# Patient Record
Sex: MALE | Race: OTHER | HISPANIC OR LATINO | Employment: FULL TIME | URBAN - METROPOLITAN AREA
[De-identification: names, ages, dates, MRNs, and addresses within clinical notes are randomized per-mention and may not be internally consistent; named-entity substitution may affect disease eponyms.]

---

## 2018-01-03 ENCOUNTER — HOSPITAL ENCOUNTER (OUTPATIENT)
Facility: MEDICAL CENTER | Age: 48
End: 2018-01-03
Attending: NURSE PRACTITIONER
Payer: COMMERCIAL

## 2018-01-03 PROCEDURE — 82306 VITAMIN D 25 HYDROXY: CPT

## 2018-01-03 PROCEDURE — 84443 ASSAY THYROID STIM HORMONE: CPT

## 2018-01-03 PROCEDURE — 82607 VITAMIN B-12: CPT

## 2018-01-04 LAB
25(OH)D3 SERPL-MCNC: 12 NG/ML (ref 30–100)
TSH SERPL DL<=0.005 MIU/L-ACNC: 1.38 UIU/ML (ref 0.38–5.33)
VIT B12 SERPL-MCNC: 262 PG/ML (ref 211–911)

## 2024-01-04 ENCOUNTER — NON-PROVIDER VISIT (OUTPATIENT)
Dept: URGENT CARE | Facility: CLINIC | Age: 54
End: 2024-01-04
Payer: COMMERCIAL

## 2024-01-04 ENCOUNTER — OCCUPATIONAL MEDICINE (OUTPATIENT)
Dept: URGENT CARE | Facility: CLINIC | Age: 54
End: 2024-01-04
Payer: COMMERCIAL

## 2024-01-04 VITALS
TEMPERATURE: 97.4 F | HEART RATE: 62 BPM | DIASTOLIC BLOOD PRESSURE: 80 MMHG | HEIGHT: 66 IN | RESPIRATION RATE: 12 BRPM | BODY MASS INDEX: 24.73 KG/M2 | SYSTOLIC BLOOD PRESSURE: 132 MMHG | WEIGHT: 153.9 LBS | OXYGEN SATURATION: 100 %

## 2024-01-04 DIAGNOSIS — Z02.6 ENCOUNTER RELATED TO WORKER'S COMPENSATION CLAIM: ICD-10-CM

## 2024-01-04 DIAGNOSIS — R22.0 LIP SWELLING: ICD-10-CM

## 2024-01-04 DIAGNOSIS — S00.531A CONTUSION OF LIP, INITIAL ENCOUNTER: ICD-10-CM
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
## 2024-01-04 LAB
AMP AMPHETAMINE: NORMAL
BREATH ALCOHOL COMMENT: NORMAL
COC COCAINE: NORMAL
INT CON NEG: NORMAL
INT CON POS: NORMAL
MET METHAMPHETAMINES: NORMAL
OPI OPIATES: NORMAL
PCP PHENCYCLIDINE: NORMAL
POC BREATHALIZER: 0 PERCENT (ref 0–0.01)
POC DRUG COMMENT 753798-OCCUPATIONAL HEALTH: NEGATIVE
THC: NORMAL

## 2024-01-04 PROCEDURE — 80305 DRUG TEST PRSMV DIR OPT OBS: CPT | Performed by: PHYSICIAN ASSISTANT

## 2024-01-04 PROCEDURE — 82075 ASSAY OF BREATH ETHANOL: CPT | Performed by: PHYSICIAN ASSISTANT

## 2024-01-04 PROCEDURE — 3075F SYST BP GE 130 - 139MM HG: CPT | Performed by: PHYSICIAN ASSISTANT

## 2024-01-04 PROCEDURE — 3079F DIAST BP 80-89 MM HG: CPT | Performed by: PHYSICIAN ASSISTANT

## 2024-01-04 PROCEDURE — 99203 OFFICE O/P NEW LOW 30 MIN: CPT | Performed by: PHYSICIAN ASSISTANT

## 2024-01-04 RX ORDER — AMOXICILLIN AND CLAVULANATE POTASSIUM 875; 125 MG/1; MG/1
1 TABLET, FILM COATED ORAL 2 TIMES DAILY
Qty: 10 TABLET | Refills: 0 | Status: SHIPPED | OUTPATIENT
Start: 2024-01-04 | End: 2024-01-09

## 2024-01-04 NOTE — LETTER
[facility] [address] [place] [address]  Phone:  [phone] - Fax:  [phone]   [facility] Progress Report and Disability Certification  Date of Service: 1/4/2024   No Show:  No  Date / Time of Next Visit:     Claim Information   Patient Name: Otf Pabon  Claim Number:     Employer: OTHER  Date of Injury: 1/4/2024     Insurer / TPA: [employer]  ID / SSN:     Occupation: Fence  Diagnosis: Diagnoses of Contusion of lip, initial encounter, Lip swelling, and Work related injury were pertinent to this visit.    Medical Information   Related to Industrial Injury? Yes    Subjective Complaints:  DOI: 1/4/2024    ADEOLA: Was working on installing a fence when he was struck in the mouth with a piece of wood    Initial visit:  Presents today for the above-stated injury on the above-stated date.  States that following the injury has been experiencing localized swelling present over the left lower lip.  He denies any loose teeth at this time.  No headache, no vision changes.  No neck pain or stiffness.  He does have past injury to the mouth that did result in knocking out several teeth.  No secondary occupation.   Objective Findings: Examination of the external lip does reveal localized swelling with skin abrasions present on the external surface of the lip.  Internal examination of the lip does reveal a small bite wound from the lower teeth without excessive wound gaping.  There is missing teeth on the left lower jaw, patient states this is not a result from his injury today.   Pre-Existing Condition(s):     Assessment:   Initial Visit    Status: Additional Care Required  Comments:Return to the urgent care in 5 days  Permanent Disability:No    Plan:   Comments:Start the patient on Augmentin due to the bite wound present on the internal surface of the left lower lip.  Use over-the-counter medications for pain and swelling.   Return to urgent care in 5 days for reevaluation.  Able to perform full work duty    Diagnostics:      Comments:       Disability Information   Status: Released to Full Duty    From:     Through:   Restrictions are: Temporary   Physical Restrictions   Sitting:    Standing:    Stooping:    Bending:      Squatting:    Walking:    Climbing:    Pushing:      Pulling:    Other:    Reaching Above Shoulder (L):   Reaching Above Shoulder (R):       Reaching Below Shoulder (L):    Reaching Below Shoulder (R):      Not to exceed Weight Limits   Carrying(hrs):   Weight Limit(lb):   Lifting(hrs):   Weight  Limit(lb):     Comments: Start the patient on Augmentin due to the bite wound present on the internal surface of the left lower lip. Use over-the-counter medications for pain and swelling. Return to urgent care in 5 days for reevaluation. Able to perform full work duty    Repetitive Actions   Hands: i.e. Fine Manipulations from Grasping:     Feet: i.e. Operating Foot Controls:     Driving / Operate Machinery:     Health Care Provider’s Original or Electronic Signature  Romain Winston P.A.-C. Health Care Provider’s Original or Electronic Signature    Fernando Walls DO MPH     Clinic Name / Location: [place] [address]  [place] [address] Clinic Phone Number: Dept: [phone]   Appointment Time: 10:45 Am Visit Start Time: 1:54 PM   Check-In Time:  11:48 Am Visit Discharge Time:     Original-Treating Physician or Chiropractor    Page 2-Insurer/TPA    Page 3-Employer    Page 4-Employee

## 2024-01-04 NOTE — PROGRESS NOTES
"Subjective:     Otf Pabon is a 53 y.o. male who presents for Other (NEW WC DOI: 1/4/24 (L) side of lower lip )      DOI: 1/4/2024    ADEOLA: Was working on installing a fence when he was struck in the mouth with a piece of wood    Initial visit:  Presents today for the above-stated injury on the above-stated date.  States that following the injury has been experiencing localized swelling present over the left lower lip.  He denies any loose teeth at this time.  No headache, no vision changes.  No neck pain or stiffness.  He does have past injury to the mouth that did result in knocking out several teeth.  No secondary occupation.    PMH:   No pertinent past medical history to this problem  MEDS:  Medications were reviewed in EMR  ALLERGIES:  Allergies were reviewed in EMR  SOCHX:  Works as a labor  FH:   No pertinent family history to this problem       Objective:     /80 (BP Location: Left arm, Patient Position: Sitting, BP Cuff Size: Adult)   Pulse 62   Temp 36.3 °C (97.4 °F) (Temporal)   Resp 12   Ht 1.676 m (5' 6\")   Wt 69.8 kg (153 lb 14.4 oz)   SpO2 100%   BMI 24.84 kg/m²     Examination of the external lip does reveal localized swelling with skin abrasions present on the external surface of the lip.  Internal examination of the lip does reveal a small bite wound from the lower teeth without excessive wound gaping.  There is missing teeth on the left lower jaw, patient states this is not a result from his injury today.    Assessment/Plan:     Encounter Diagnoses   Name Primary?    Contusion of lip, initial encounter     Lip swelling     Work related injury          Plan:  Start the patient on Augmentin due to the bite wound present on the internal surface of the left lower lip. Use over-the-counter medications for pain and swelling. Return to urgent care in 5 days for reevaluation. Able to perform full work duty     Differential diagnosis, natural history, supportive care, and " indications for immediate follow-up discussed.    Romain Winston PA-C

## 2024-01-04 NOTE — LETTER
"    EMPLOYEE’S CLAIM FOR COMPENSATION/ REPORT OF INITIAL TREATMENT  FORM C-4  PLEASE TYPE OR PRINT    EMPLOYEE’S CLAIM - PROVIDE ALL INFORMATION REQUESTED   First Name                    CELSA Vanessa Last Name  Pepper Birthdate                    1970                Sex  []M  []F Claim Number (Insurer’s Use Only)     Home Address  [address] Age  53 y.o. Height  1.676 m (5' 6\") Weight  69.8 kg (153 lb 14.4 oz) Social Security Number     [facility] [place]  [address] Telephone  There are no phone numbers on file.   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 (if applicable)  Trabajando    How did this injury or occupational disease occur? (Be specific and answer in detail. Use additional sheet if necessary)  estave cortando postes [id number] pt. para empesar a aser e serco de madesa y con la corTadora electrica y se atoro y Revoto a Mi boca y paso   If you believe that you have an occupational disease, when did you first have knowledge of the disability and its relationship to your employment?  N/A Witnesses to the Accident (if applicable)  Si mi ayudante      Nature of Injury or Occupational Disease  Amputation  Part(s) of Body Injured or Affected  Mouth N/A " N/A    I CERTIFY THAT THE ABOVE IS TRUE AND CORRECT TO T HE BEST OF MY KNOWLEDGE AND THAT I HAVE PROVIDED THIS INFORMATION IN ORDER TO OBTAIN THE BENEFITS OF [place], OR [place]).  I HEREBY AUTHORIZE ANY PHYSICIAN, CHIROPRACTOR, SURGEON, PRACTITIONER OR ANY OTHER PERSON, ANY HOSPITAL, INCLUDING [facility] OR [facility], ANY  MEDICAL SERVICE ORGANIZATION, ANY INSURANCE COMPANY, OR OTHER INSTITUTION OR ORGANIZATION TO RELEASE TO EACH OTHER, ANY MEDICAL OR OTHER INFORMATION, INCLUDING BENEFITS PAID OR PAYABLE, PERTINENT TO THIS INJURY OR DISEASE, EXCEPT INFORMATION RELATIVE TO DIAGNOSIS, TREATMENT AND/OR COUNSELING FOR AIDS, PSYCHOLOGICAL CONDITIONS, ALCOHOL OR CONTROLLED SUBSTANCES, FOR WHICH I MUST GIVE SPECIFIC AUTHORIZATION.  A PHOTOSTAT OF THIS AUTHORIZATION SHALL BE VALID AS THE ORIGINAL.     Date   Place Employee’s Original or  *Electronic Signature   THIS REPORT MUST BE COMPLETED AND MAILED WITHIN 3 WORKING DAYS OF TREATMENT   Place  [facility]    Name of Facility  [facility]   Date 1/4/2024 Diagnosis and Description of Injury or Occupational Disease  (S00.531A) Contusion of lip, initial encounter  (R22.0) Lip swelling  (Y99.0) Work related injury  Diagnoses of Contusion of lip, initial encounter, Lip swelling, and Work related injury were pertinent to this visit. Is there evidence that the injured employee was under the influence of alcohol and/or another controlled substance at the time of accident?  []No  [] Yes (if yes, please explain)   Hour 1:54 PM  No   Treatment: Start the patient on Augmentin due to the bite wound present on the internal surface of the left lower lip. Use over-the-counter medications for pain and swelling. Return to urgent care in 5 days for reevaluation. Able to perform full work duty    Have you advised the patient to remain off work five days or more?   []  Yes Indicate dates: From   To    []No If no, is the injured employee capable of: [] full duty [] modified duty                                                             Yes     If modified duty, specify any limitations / restrictions:                                                                                                                                                                                                                                                                                                                                                                                                                  X-Ray Findings:      From information given by the employee, together with medical evidence, can you directly connect this injury or occupational disease as job incurred?  []Yes   [] No Yes    Is additional medical care by a physician indicated? []Yes [] No  Yes    Do you know of any previous injury or disease contributing to this condition or occupational disease? []Yes [] No (Explain if yes)                          No   Date  1/4/2024 Print Health Care Provider’s Name  Kassie Winston P.A.-C. I certify that the employer’s copy of  this form was delivered to the employer on:   Address  [address] [place] [address] INSURER'S USE ONLY                       [facility]  [address] Provider’s Tax ID Number  [id number]   Telephone  Dept: [phone]    Health Care Provider’s Original or Electronic Signature  e-KASSIE Sahu P.A.-C. Degree (MD,DO, DC,PA-C,APRN)  [employer]  Choose (if applicable)      ORIGINAL - TREATING HEALTHCARE PROVIDER PAGE 2 - INSURER/TPA PAGE 3 - EMPLOYER PAGE 4 - EMPLOYEE             Form C-4 (rev.08/23)        BRIEF DESCRIPTION OF RIGHTS AND BENEFITS  (Pursuant to [place] 616C.050)    Notice of Injury or Occupational Disease (Incident Report Form C-1): If an injury or occupational disease (OD) arises out of and in the course of employment, you must provide written notice to  "your employer as soon as practicable, but no later than 7 days after the accident or OD. Your employer shall maintain a sufficient supply of the required forms.    Claim for Compensation (Form C-4): If medical treatment is sought, the form C-4 is available at the place of initial treatment. A completed \"Claim for Compensation\" (Form C-4) must be filed within 90 days after an accident or OD. The treating physician or chiropractor must, within 3 working days after treatment, complete and mail to the employer, the employer's insurer and third-party , the Claim for Compensation.    Medical Treatment: If you require medical treatment for your on-the-job injury or OD, you may be required to select a physician or chiropractor from a list provided by your workers’ compensation insurer, if it has contracted with an Organization for Managed Care (MCO) or Preferred Provider Organization (PPO) or providers of health care. If your employer has not entered into a contract with an MCO or PPO, you may select a physician or chiropractor from the Panel of Physicians and Chiropractors. Any medical costs related to your industrial injury or OD will be paid by your insurer.    Temporary Total Disability (TTD): If your doctor has certified that you are unable to work for a period of at least 5 consecutive days, or 5 cumulative days in a 20-day period, or places restrictions on you that your employer does not accommodate, you may be entitled to TTD compensation.    Temporary Partial Disability (TPD): If the wage you receive upon reemployment is less than the compensation for TTD to which you are entitled, the insurer may be required to pay you TPD compensation to make up the difference. TPD can only be paid for a maximum of 24 months.    Permanent Partial Disability (PPD): When your medical condition is stable and there is an indication of a PPD as a result of your injury or OD, within 30 days, your insurer must arrange for " an evaluation by a rating physician or chiropractor to determine the degree of your PPD. The amount of your PPD award depends on the date of injury, the results of the PPD evaluation, your age and wage.    Permanent Total Disability (PTD): If you are medically certified by a treating physician or chiropractor as permanently and totally disabled and have been granted a PTD status by your insurer, you are entitled to receive monthly benefits not to exceed 66 2/3% of your average monthly wage. The amount of your PTD payments is subject to reduction if you previously received a lump-sum PPD award.    Vocational Rehabilitation Services: You may be eligible for vocational rehabilitation services if you are unable to return to the job due to a permanent physical impairment or permanent restrictions as a result of your injury or occupational disease.    Transportation and Per Priscilla Reimbursement: You may be eligible for travel expenses and per priscilla associated with medical treatment.    Reopening: You may be able to reopen your claim if your condition worsens after claim closure.     Appeal Process: If you disagree with a written determination issued by the insurer or the insurer does not respond to your request, you may appeal to the Department of Administration, , by following the instructions contained in your determination letter. You must appeal the determination within 70 days from the date of the determination letter at [address], [address], [place], [place] [address], or [address] [facility] [address], [place] [address]. If you disagree with the  decision, you may appeal to the Department of Administration, . You must file your appeal within 30 days from the date of the  decision letter at [address], [address], [place], [place] [address], or [address] [facility] [address], [place] [address]. If you disagree with a  decision of an , you may file a petition for judicial review with the District Court. You must do so within 30 days of the Appeal Officer’s decision. You may be represented by an  at your own expense or you may contact the [facility] for possible representation.    [place]  for Injured Workers (NAIW): If you disagree with a  decision, you may request that NAIW represent you without charge at an  Hearing. For information regarding denial of benefits, you may contact the [facility] at: [address], [address], [place] [address], [phone], or [address], [place] [address], [phone]    To File a Complaint with the Division: If you wish to file a complaint with the  of the Division of Industrial Relations (DIR),  please contact the Workers’ Compensation Section, [address], [address], [place], [place] [address], telephone [phone]) [phone], or [address], [address], [place], [place] [address], telephone [phone].    For assistance with Workers’ Compensation Issues: You may contact the [facility] Office for Consumer Health Assistance, [address], [address], [place], [place] [address], Toll Free 1-846.707.6895, Web site: [email or web address] E-mail: [email or web address]              __________________________________________________________________                                    _________________            Employee Name / Signature                                                                                                                            Date                                                                                                                                                                                                                              D-2 (rev. 10/20)

## 2024-01-09 ENCOUNTER — OCCUPATIONAL MEDICINE (OUTPATIENT)
Dept: URGENT CARE | Facility: CLINIC | Age: 54
End: 2024-01-09
Payer: COMMERCIAL

## 2024-01-09 VITALS
BODY MASS INDEX: 24.94 KG/M2 | HEART RATE: 72 BPM | HEIGHT: 66 IN | WEIGHT: 155.2 LBS | TEMPERATURE: 99.2 F | DIASTOLIC BLOOD PRESSURE: 76 MMHG | SYSTOLIC BLOOD PRESSURE: 110 MMHG | OXYGEN SATURATION: 97 % | RESPIRATION RATE: 14 BRPM

## 2024-01-09 DIAGNOSIS — S00.501A SUPERFICIAL INJURY OF LIP WITH INFECTION, INITIAL ENCOUNTER: ICD-10-CM

## 2024-01-09 DIAGNOSIS — L08.9 SUPERFICIAL INJURY OF LIP WITH INFECTION, INITIAL ENCOUNTER: ICD-10-CM

## 2024-01-09 DIAGNOSIS — S00.531D CONTUSION OF LIP, SUBSEQUENT ENCOUNTER: ICD-10-CM

## 2024-01-09 PROCEDURE — 99214 OFFICE O/P EST MOD 30 MIN: CPT | Performed by: NURSE PRACTITIONER

## 2024-01-09 PROCEDURE — 3074F SYST BP LT 130 MM HG: CPT | Performed by: NURSE PRACTITIONER

## 2024-01-09 PROCEDURE — 3078F DIAST BP <80 MM HG: CPT | Performed by: NURSE PRACTITIONER

## 2024-01-09 RX ORDER — CLINDAMYCIN HYDROCHLORIDE 300 MG/1
300 CAPSULE ORAL 3 TIMES DAILY
Qty: 15 CAPSULE | Refills: 0 | Status: SHIPPED | OUTPATIENT
Start: 2024-01-09 | End: 2024-01-14

## 2024-01-09 RX ORDER — LIDOCAINE HYDROCHLORIDE 20 MG/ML
15 SOLUTION OROPHARYNGEAL PRN
Qty: 100 ML | Refills: 0 | Status: SHIPPED | OUTPATIENT
Start: 2024-01-09

## 2024-01-09 NOTE — LETTER
[facility] [address] [place] [address]  Phone:  [phone] - Fax:  [phone]   [facility] Progress Report and Disability Certification  Date of Service: 1/9/2024   No Show:  No  Date / Time of Next Visit: 1/13/2024 @ 11:00 AM    Claim Information   Patient Name: Otf Pabon  Claim Number:     Employer: [employer]  Date of Injury: 1/4/2024     Insurer / TPA: [employer]  ID / SSN:     Occupation: Fence  Diagnosis: Diagnoses of Contusion of lip, subsequent encounter and Superficial injury of lip with infection, initial encounter were pertinent to this visit.    Medical Information   Related to Industrial Injury? Yes    Subjective Complaints:    DOI: 1/4/2024    ADEOLA: Was working on installing a fence when he was struck in the mouth with a piece of wood  Patient returns urgent care for first follow-up visit after initial injury continues to have notable amount of pain and swelling of the wound of the left lip with swelling.  Has pain.  Is concerned of worsening infection.  Currently taking Augmentin and has 1 day left of prescription.  Denies any fevers.  Has been back to work tolerating.     Objective Findings: Lip: Left lower lip edematous, inner mucosa superficial wound present with purulent discharge.  No erythema no dental pain afebrile   Pre-Existing Condition(s):     Assessment:   Condition Worsened    Status: Additional Care Required  Permanent Disability:No    Plan: Medication    Diagnostics:      Comments:       Disability Information   Status: Released to Full Duty    From:  1/9/2024  Through: 1/13/2024 Restrictions are:     Physical Restrictions   Sitting:    Standing:    Stooping:    Bending:      Squatting:    Walking:    Climbing:    Pushing:      Pulling:    Other:    Reaching Above Shoulder (L):   Reaching Above Shoulder (R):       Reaching Below Shoulder (L):    Reaching Below Shoulder (R):      Not  to exceed Weight Limits   Carrying(hrs):   Weight Limit(lb):   Lifting(hrs):   Weight  Limit(lb):     Comments: Encourage patient to finish last day of Augmentin he will be started on clindamycin x 5 days as I am concerned of present infection lidocaine provided for pain relief.  Patient will continue with full duty without restrictions.  Follow-up in 4 days for recheck.    Repetitive Actions   Hands: i.e. Fine Manipulations from Grasping:     Feet: i.e. Operating Foot Controls:     Driving / Operate Machinery:     Health Care Provider’s Original or Electronic Signature  NIGEL Patterson. Health Care Provider’s Original or Electronic Signature    Fernando Walls DO MPH     Clinic Name / Location: [address] [facility]  [place] [address] Clinic Phone Number: Dept: [phone]   Appointment Time: 4:00 Pm Visit Start Time: 4:13 PM   Check-In Time:  3:42 Pm Visit Discharge Time:  4:42 PM    Original-Treating Physician or Chiropractor    Page 2-Insurer/TPA    Page 3-Employer    Page 4-Employee

## 2024-01-10 NOTE — PROGRESS NOTES
"Subjective:   Otf Pabon  is a 53 y.o. male who presents for Follow-Up (Patient states they still have px from last visit. Painful to eat x 2 days.)      DOI: 1/4/2024    ADEOLA: Was working on installing a fence when he was struck in the mouth with a piece of wood  Patient returns urgent care for first follow-up visit after initial injury continues to have notable amount of pain and swelling of the wound of the left lip with swelling.  Has pain.  Is concerned of worsening infection.  Currently taking Augmentin and has 1 day left of prescription.  Denies any fevers.  Has been back to work tolerating.     HPI  Review of Systems   HENT:          Lip injury        No Known Allergies   Objective:   /76   Pulse 72   Temp 37.3 °C (99.2 °F) (Temporal)   Resp 14   Ht 1.676 m (5' 6\")   Wt 70.4 kg (155 lb 3.2 oz)   SpO2 97%   BMI 25.05 kg/m²   Physical Exam  Constitutional:       Appearance: Normal appearance. He is not ill-appearing or toxic-appearing.   HENT:      Head: Normocephalic.      Right Ear: External ear normal.      Left Ear: External ear normal.      Nose: Nose normal.      Mouth/Throat:      Lips: Pink.     Eyes:      General: Lids are normal.   Pulmonary:      Effort: Pulmonary effort is normal. No accessory muscle usage.   Musculoskeletal:      Cervical back: Full passive range of motion without pain.   Neurological:      Mental Status: He is alert and oriented to person, place, and time.   Psychiatric:         Mood and Affect: Mood normal.         Thought Content: Thought content normal.       Lip: Left lower lip edematous, inner mucosa superficial wound present with purulent discharge.  No erythema no dental pain afebrile   Assessment/Plan:   1. Contusion of lip, subsequent encounter  - lidocaine (XYLOCAINE) 2 % Solution; Take 15 mL by mouth as needed for Throat/Mouth Pain.  Dispense: 100 mL; Refill: 0  - clindamycin (CLEOCIN) 300 MG Cap; Take 1 Capsule by mouth 3 times a day for " 5 days.  Dispense: 15 Capsule; Refill: 0    2. Superficial injury of lip with infection, initial encounter  - lidocaine (XYLOCAINE) 2 % Solution; Take 15 mL by mouth as needed for Throat/Mouth Pain.  Dispense: 100 mL; Refill: 0  - clindamycin (CLEOCIN) 300 MG Cap; Take 1 Capsule by mouth 3 times a day for 5 days.  Dispense: 15 Capsule; Refill: 0  Encourage patient to finish last day of Augmentin he will be started on clindamycin x 5 days as I am concerned of present infection lidocaine provided for pain relief.  Patient will continue with full duty without restrictions.  Follow-up in 4 days for recheck.  Differential diagnosis, natural history, supportive care, and indications for immediate follow-up discussed.

## 2024-01-13 ENCOUNTER — OCCUPATIONAL MEDICINE (OUTPATIENT)
Dept: URGENT CARE | Facility: CLINIC | Age: 54
End: 2024-01-13
Payer: COMMERCIAL

## 2024-01-13 VITALS
HEIGHT: 66 IN | HEART RATE: 67 BPM | BODY MASS INDEX: 24.91 KG/M2 | OXYGEN SATURATION: 99 % | SYSTOLIC BLOOD PRESSURE: 102 MMHG | WEIGHT: 155 LBS | TEMPERATURE: 97.9 F | DIASTOLIC BLOOD PRESSURE: 60 MMHG | RESPIRATION RATE: 16 BRPM

## 2024-01-13 DIAGNOSIS — S00.531D CONTUSION OF LIP, SUBSEQUENT ENCOUNTER: ICD-10-CM

## 2024-01-13 PROCEDURE — 99213 OFFICE O/P EST LOW 20 MIN: CPT | Performed by: NURSE PRACTITIONER

## 2024-01-13 PROCEDURE — 3078F DIAST BP <80 MM HG: CPT | Performed by: NURSE PRACTITIONER

## 2024-01-13 PROCEDURE — 3074F SYST BP LT 130 MM HG: CPT | Performed by: NURSE PRACTITIONER

## 2024-01-13 RX ORDER — CHLORHEXIDINE GLUCONATE ORAL RINSE 1.2 MG/ML
15 SOLUTION DENTAL 2 TIMES DAILY
Qty: 120 ML | Refills: 0 | Status: SHIPPED | OUTPATIENT
Start: 2024-01-13 | End: 2024-01-17

## 2024-01-13 ASSESSMENT — ENCOUNTER SYMPTOMS
BLURRED VISION: 0
FEVER: 0
DIZZINESS: 0
HEADACHES: 0

## 2024-01-13 NOTE — LETTER
[facility] [address] [place] [address]  Phone:  [phone] - Fax:  [phone]   [facility] Progress Report and Disability Certification  Date of Service: 1/13/2024   No Show:  No  Date / Time of Next Visit: 1/17/2024 @5PM    Claim Information   Patient Name: Otf Pabon  Claim Number:     Employer: OTHER Date of Injury: 1/4/2024     Insurer / TPA: [employer]  ID / SSN:     Occupation: Fence  Diagnosis: The encounter diagnosis was Contusion of lip, subsequent encounter.    Medical Information   Related to Industrial Injury? Yes    Subjective Complaints:  DOI: 1/4/2024. Reports improvement in lip symptoms, including swelling. Decreased swelling. Denies fever. Taking ibuprofen for pain. Had been taking the antibiotic as directed. He has also been using dilute hydrogen peroxide. No drainage.            Objective Findings: Physical Exam  HENT:      Mouth/Throat:      Comments: 1 x 1 cm open ulcerative type wound with yellow slough to inner lower left lip. Mild swelling and tenderness. No dental tenderness to palpation. No surrounding erythema.        Pre-Existing Condition(s): None reported.    Assessment:   Condition Improved    Status: Additional Care Required  Permanent Disability:No    Plan: Medication    Diagnostics:      Comments:  - chlorhexidine (PERIDEX) 0.12 % Solution; Take 15 mL by mouth 2 times a day for 4 days.  Dispense: 120 mL; Refill: 0      Disability Information   Status: Released to Full Duty    From:  1/13/2024  Through: 1/17/2024 Restrictions are:     Physical Restrictions   Sitting:    Standing:    Stooping:    Bending:      Squatting:    Walking:    Climbing:    Pushing:      Pulling:    Other:    Reaching Above Shoulder (L):   Reaching Above Shoulder (R):       Reaching Below Shoulder (L):    Reaching Below Shoulder (R):      Not to exceed Weight Limits   Carrying(hrs):   Weight Limit(lb):    Lifting(hrs):   Weight  Limit(lb):     Comments: -Ice for swelling.  -Finish course of clindamycin  -Discontinue use of hydrogen peroxide.   -Ibuprofen or Tylenol for pain and inflammation.  -Oral Hygeine  -Rinse the mouth with water after eating.   -Avoid spicy or salty foods until the wound is healed.  -Avoid the use of straws (negative pressure may increase ecchymosis or bleeding at the wound site).  -Follow up in 4 days    Follow up sooner for increased signs of infection (redness, pus, pain, increased swelling or fever), persistent or severe headache, vision changes, dizziness, nausea, vomiting, or any other concerns.    Repetitive Actions   Hands: i.e. Fine Manipulations from Grasping:     Feet: i.e. Operating Foot Controls:     Driving / Operate Machinery:     Health Care Provider’s Original or Electronic Signature  KELLIE Pineda Health Care Provider’s Original or Electronic Signature    Fernando Walls DO MPH     Clinic Name / Location: [place] [address]  [place] [address] Clinic Phone Number: Dept: [phone]   Appointment Time: 11:00 Am Visit Start Time: 10:41 AM   Check-In Time:  10:37 Am Visit Discharge Time: 11:42 PM    Original-Treating Physician or Chiropractor    Page 2-Insurer/TPA    Page 3-Employer    Page 4-Employee

## 2024-01-13 NOTE — PATIENT INSTRUCTIONS
-Ice for swelling.  -Finish course of clindamycin  -Discontinue use of hydrogen peroxide.   -Ibuprofen or Tylenol for pain and inflammation.  -Oral Hygeine  -Rinse the mouth with water after eating.   -Avoid spicy or salty foods until the wound is healed.  -Avoid the use of straws (negative pressure may increase ecchymosis or bleeding at the wound site).  -Follow up in 4 days    Follow up sooner for increased signs of infection (redness, pus, pain, increased swelling or fever), persistent or severe headache, vision changes, dizziness, nausea, vomiting, or any other concerns.

## 2024-01-13 NOTE — PROGRESS NOTES
"  Subjective:     Otf Pabon is a 53 y.o. male who presents for Follow-Up (Lip injury, pt states it is feeling better/[facility])      DOI: 1/4/2024. Reports improvement in lip symptoms, including swelling. Denies fever. No drainage. Taking ibuprofen for pain as needed. Has been taking the antibiotic as directed. He has also been using dilute hydrogen peroxide to clean the wound.              History reviewed. No pertinent past medical history.    History reviewed. No pertinent surgical history.    Social History     Socioeconomic History    Marital status:      Spouse name: Not on file    Number of children: Not on file    Years of education: Not on file    Highest education level: Not on file   Occupational History    Not on file   Tobacco Use    Smoking status: Never    Smokeless tobacco: Never   Vaping Use    Vaping Use: Never used   Substance and Sexual Activity    Alcohol use: Not Currently    Drug use: Not Currently    Sexual activity: Not on file   Other Topics Concern    Not on file   Social History Narrative    Not on file     Social Determinants of Health     Financial Resource Strain: Not on file   Food Insecurity: Not on file   Transportation Needs: Not on file   Physical Activity: Not on file   Stress: Not on file   Social Connections: Not on file   Intimate Partner Violence: Not on file   Housing Stability: Not on file        History reviewed. No pertinent family history.     No Known Allergies    Review of Systems   Constitutional:  Negative for fever.   Eyes:  Negative for blurred vision.   Neurological:  Negative for dizziness and headaches.   All other systems reviewed and are negative.       Objective:   /60   Pulse 67   Temp 36.6 °C (97.9 °F)   Resp 16   Ht 1.676 m (5' 6\")   Wt 70.3 kg (155 lb)   SpO2 99%   BMI 25.02 kg/m²     Physical Exam  Vitals reviewed.   Constitutional:       General: He is not in acute distress.  HENT:      Mouth/Throat:      Comments: 1 x " 1 cm irregular shaped open ulcerative type wound with yellow slough to inner lower left lip. Mild swelling and tenderness. No dental tenderness to palpation. No surrounding erythema.   Skin:     General: Skin is warm and dry.   Neurological:      Mental Status: He is alert and oriented to person, place, and time.         Assessment/Plan:   1. Contusion of lip, subsequent encounter  - chlorhexidine (PERIDEX) 0.12 % Solution; Take 15 mL by mouth 2 times a day for 4 days.  Dispense: 120 mL; Refill: 0    -Ice for swelling.  -Finish course of clindamycin  -Discontinue use of hydrogen peroxide.   -Ibuprofen or Tylenol for pain and inflammation.  -Oral Hygeine  -Rinse the mouth with water after eating.   -Avoid spicy or salty foods until the wound is healed.  -Avoid the use of straws (negative pressure may increase ecchymosis or bleeding at the wound site).  -Follow up in 4 days    Follow up sooner for increased signs of infection (redness, pus, pain, increased swelling or fever), persistent or severe headache, vision changes, dizziness, nausea, vomiting, or any other concerns.    -Afebrile. Has had improvement in symptoms. Advised continuing course of oral antibiotics, with f/I in 4 days for re-evaluation.     Differential diagnosis, natural history, supportive care, and indications for immediate follow-up discussed.

## 2024-01-17 ENCOUNTER — OCCUPATIONAL MEDICINE (OUTPATIENT)
Dept: URGENT CARE | Facility: CLINIC | Age: 54
End: 2024-01-17
Payer: COMMERCIAL

## 2024-01-17 VITALS
OXYGEN SATURATION: 98 % | BODY MASS INDEX: 24.91 KG/M2 | TEMPERATURE: 97.7 F | WEIGHT: 155 LBS | HEART RATE: 63 BPM | HEIGHT: 66 IN | DIASTOLIC BLOOD PRESSURE: 62 MMHG | SYSTOLIC BLOOD PRESSURE: 108 MMHG | RESPIRATION RATE: 14 BRPM

## 2024-01-17 DIAGNOSIS — S00.531D CONTUSION OF LIP, SUBSEQUENT ENCOUNTER: ICD-10-CM
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
## 2024-01-17 PROCEDURE — 3074F SYST BP LT 130 MM HG: CPT | Performed by: NURSE PRACTITIONER

## 2024-01-17 PROCEDURE — 3078F DIAST BP <80 MM HG: CPT | Performed by: NURSE PRACTITIONER

## 2024-01-17 PROCEDURE — 99213 OFFICE O/P EST LOW 20 MIN: CPT | Performed by: NURSE PRACTITIONER

## 2024-01-17 ASSESSMENT — ENCOUNTER SYMPTOMS
NAUSEA: 0
FEVER: 0
VOMITING: 0
CHILLS: 0

## 2024-01-17 NOTE — LETTER
[facility] [address] [place] [address]  Phone:  [phone] - Fax:  [phone]   [facility] Progress Report and Disability Certification  Date of Service: 1/17/2024   No Show:  No  Date / Time of Next Visit:     Claim Information   Patient Name: Otf Pabon  Claim Number:     Employer: [employer] Date of Injury: 1/4/2024     Insurer / TPA: [employer]  ID / SSN:     Occupation: Fence  Diagnosis: Diagnoses of Contusion of lip, subsequent encounter and Work related injury were pertinent to this visit.    Medical Information   Related to Industrial Injury? Yes    Subjective Complaints:  DOI: 1/4/2024    ADEOLA: Was working on installing a fence when he was struck in the mouth with a piece of wood     Initial visit:  Presents today for the above-stated injury on the above-stated date.  States that following the injury has been experiencing localized swelling present over the left lower lip.  He denies any loose teeth at this time.  No headache, no vision changes.  No neck pain or stiffness.  He does have past injury to the mouth that did result in knocking out several teeth.  No secondary occupation.    Follow-up visit #1.  DOI: 1/4/2024    ADEOLA: Was working on installing a fence when he was struck in the mouth with a piece of wood  Patient returns urgent care for first follow-up visit after initial injury continues to have notable amount of pain and swelling of the wound of the left lip with swelling.  Has pain.  Is concerned of worsening infection.  Currently taking Augmentin and has 1 day left of prescription.  Denies any fevers.  Has been back to work tolerating.    Follow-up visit #2.  DOI: 1/4/2024. Reports improvement in lip symptoms, including swelling. Denies fever. No drainage. Taking ibuprofen for pain as needed. Has been taking the antibiotic as directed. He has also been using dilute hydrogen peroxide to clean  the wound.       Follow-up visit #3.  Symptoms have greatly improved at this time negative for drainage or bleeding. Patient does not wish to have any further follow-up visits.  Negative for pain.  He states that he does have 2 more days left of his antibiotic.   Objective Findings: Comments: Laceration of left lower inner lip healing well.  Negative for purulent drainage, bleeding, inflammation or erythema.     Pre-Existing Condition(s):     Assessment:   Condition Improved    Status: Discharged /  MMI  Permanent Disability:No    Plan: Medication    Diagnostics:      Comments:       Disability Information   Status: Released to Full Duty    From:     Through:   Restrictions are:     Physical Restrictions   Sitting:    Standing:    Stooping:    Bending:      Squatting:    Walking:    Climbing:    Pushing:      Pulling:    Other:    Reaching Above Shoulder (L):   Reaching Above Shoulder (R):       Reaching Below Shoulder (L):    Reaching Below Shoulder (R):      Not to exceed Weight Limits   Carrying(hrs):   Weight Limit(lb):   Lifting(hrs):   Weight  Limit(lb):     Comments: Laceration of left lower lip is healing well.  Patient does not wish to have any further follow-up at this time.  He may return if symptoms worsen.  He is in agreement with this plan of care.    Repetitive Actions   Hands: i.e. Fine Manipulations from Grasping:     Feet: i.e. Operating Foot Controls:     Driving / Operate Machinery:     Health Care Provider’s Original or Electronic Signature  NIGEL Escobar. Health Care Provider’s Original or Electronic Signature    Fernando Walls DO MPH     Clinic Name / Location: [place] [address]  [place] [address] Clinic Phone Number: Dept: [phone]   Appointment Time: 3:45 Pm Visit Start Time: 4:01 PM   Check-In Time:  3:39 Pm Visit Discharge Time:  4:36 PM    Original-Treating Physician or Chiropractor    Page 2-Insurer/TPA    Page 3-Employer    Page  4-Employee

## 2024-01-18 NOTE — PROGRESS NOTES
Subjective:     Otf Pabon is a 53 y.o. male who presents for Follow-Up ( [facility], lip injury)      HPI  DOI: 1/4/2024    ADEOLA: Was working on installing a fence when he was struck in the mouth with a piece of wood     Initial visit:  Presents today for the above-stated injury on the above-stated date.  States that following the injury has been experiencing localized swelling present over the left lower lip.  He denies any loose teeth at this time.  No headache, no vision changes.  No neck pain or stiffness.  He does have past injury to the mouth that did result in knocking out several teeth.  No secondary occupation.    Follow-up visit #1.  DOI: 1/4/2024    ADEOLA: Was working on installing a fence when he was struck in the mouth with a piece of wood  Patient returns urgent care for first follow-up visit after initial injury continues to have notable amount of pain and swelling of the wound of the left lip with swelling.  Has pain.  Is concerned of worsening infection.  Currently taking Augmentin and has 1 day left of prescription.  Denies any fevers.  Has been back to work tolerating.    Follow-up visit #2.  DOI: 1/4/2024. Reports improvement in lip symptoms, including swelling. Denies fever. No drainage. Taking ibuprofen for pain as needed. Has been taking the antibiotic as directed. He has also been using dilute hydrogen peroxide to clean the wound.       Follow-up visit #3.  Symptoms have greatly improved at this time negative for drainage or bleeding. Patient does not wish to have any further follow-up visits.  Negative for pain.  He states that he does have 2 more days left of his antibiotic.  Review of Systems   Constitutional:  Negative for chills and fever.   Gastrointestinal:  Negative for nausea and vomiting.       PMH: History reviewed. No pertinent past medical history.  ALLERGIES: No Known Allergies  SURGHX: History reviewed. No pertinent surgical history.  SOCHX:   Social History  "    Socioeconomic History    Marital status:    Tobacco Use    Smoking status: Never    Smokeless tobacco: Never   Vaping Use    Vaping Use: Never used   Substance and Sexual Activity    Alcohol use: Not Currently    Drug use: Not Currently     FH: History reviewed. No pertinent family history.      Objective:   /62   Pulse 63   Temp 36.5 °C (97.7 °F)   Resp 14   Ht 1.676 m (5' 6\")   Wt 70.3 kg (155 lb)   SpO2 98%   BMI 25.02 kg/m²     Physical Exam  Vitals and nursing note reviewed.   Constitutional:       General: He is not in acute distress.     Appearance: Normal appearance. He is normal weight. He is not ill-appearing or toxic-appearing.   HENT:      Head: Normocephalic.      Right Ear: External ear normal.      Left Ear: External ear normal.      Nose: Nose normal.      Mouth/Throat:      Mouth: Mucous membranes are moist.      Comments: Laceration of left lower inner lip healing well.  Negative for purulent drainage, bleeding, inflammation or erythema.  Eyes:      General:         Right eye: No discharge.         Left eye: No discharge.      Extraocular Movements: Extraocular movements intact.      Conjunctiva/sclera: Conjunctivae normal.      Pupils: Pupils are equal, round, and reactive to light.   Pulmonary:      Effort: Pulmonary effort is normal.      Breath sounds: Normal breath sounds.   Abdominal:      General: Abdomen is flat.   Musculoskeletal:         General: Normal range of motion.      Cervical back: Normal range of motion and neck supple. No rigidity.   Lymphadenopathy:      Cervical: No cervical adenopathy.   Skin:     General: Skin is warm and dry.   Neurological:      General: No focal deficit present.      Mental Status: He is alert and oriented to person, place, and time. Mental status is at baseline.   Psychiatric:         Mood and Affect: Mood normal.         Behavior: Behavior normal.         Judgment: Judgment normal.         Assessment/Plan:   Assessment    1. " Contusion of lip, subsequent encounter        2. Work related injury          Laceration of left lower lip is healing well.  Patient does not wish to have any further follow-up at this time.  He may return if symptoms worsen.  He is in agreement with this plan of care.